# Patient Record
Sex: FEMALE | Race: WHITE | NOT HISPANIC OR LATINO | URBAN - METROPOLITAN AREA
[De-identification: names, ages, dates, MRNs, and addresses within clinical notes are randomized per-mention and may not be internally consistent; named-entity substitution may affect disease eponyms.]

---

## 2021-12-08 ENCOUNTER — AMBULATORY SURGICAL CENTER (OUTPATIENT)
Dept: URBAN - METROPOLITAN AREA SURGERY 2 | Facility: SURGERY | Age: 56
End: 2021-12-08
Payer: COMMERCIAL

## 2021-12-08 VITALS
SYSTOLIC BLOOD PRESSURE: 129 MMHG | DIASTOLIC BLOOD PRESSURE: 111 MMHG | HEART RATE: 65 BPM | DIASTOLIC BLOOD PRESSURE: 75 MMHG | DIASTOLIC BLOOD PRESSURE: 89 MMHG | DIASTOLIC BLOOD PRESSURE: 87 MMHG | HEART RATE: 81 BPM | HEIGHT: 67 IN | OXYGEN SATURATION: 98 % | SYSTOLIC BLOOD PRESSURE: 130 MMHG | DIASTOLIC BLOOD PRESSURE: 111 MMHG | SYSTOLIC BLOOD PRESSURE: 130 MMHG | WEIGHT: 133 LBS | SYSTOLIC BLOOD PRESSURE: 161 MMHG | DIASTOLIC BLOOD PRESSURE: 87 MMHG | HEIGHT: 67 IN | RESPIRATION RATE: 18 BRPM | OXYGEN SATURATION: 97 % | HEART RATE: 75 BPM | HEIGHT: 67 IN | TEMPERATURE: 98.3 F | WEIGHT: 133 LBS | OXYGEN SATURATION: 97 % | HEART RATE: 81 BPM | RESPIRATION RATE: 18 BRPM | OXYGEN SATURATION: 95 % | DIASTOLIC BLOOD PRESSURE: 78 MMHG | HEART RATE: 78 BPM | DIASTOLIC BLOOD PRESSURE: 87 MMHG | WEIGHT: 133 LBS | TEMPERATURE: 98.3 F | SYSTOLIC BLOOD PRESSURE: 161 MMHG | DIASTOLIC BLOOD PRESSURE: 75 MMHG | DIASTOLIC BLOOD PRESSURE: 89 MMHG | OXYGEN SATURATION: 96 % | SYSTOLIC BLOOD PRESSURE: 131 MMHG | OXYGEN SATURATION: 98 % | DIASTOLIC BLOOD PRESSURE: 89 MMHG | OXYGEN SATURATION: 98 % | TEMPERATURE: 98 F | SYSTOLIC BLOOD PRESSURE: 129 MMHG | SYSTOLIC BLOOD PRESSURE: 139 MMHG | SYSTOLIC BLOOD PRESSURE: 139 MMHG | OXYGEN SATURATION: 95 % | SYSTOLIC BLOOD PRESSURE: 161 MMHG | SYSTOLIC BLOOD PRESSURE: 129 MMHG | OXYGEN SATURATION: 96 % | RESPIRATION RATE: 18 BRPM | SYSTOLIC BLOOD PRESSURE: 131 MMHG | HEART RATE: 95 BPM | HEART RATE: 78 BPM | OXYGEN SATURATION: 95 % | SYSTOLIC BLOOD PRESSURE: 139 MMHG | RESPIRATION RATE: 16 BRPM | HEART RATE: 75 BPM | TEMPERATURE: 98 F | OXYGEN SATURATION: 97 % | DIASTOLIC BLOOD PRESSURE: 78 MMHG | DIASTOLIC BLOOD PRESSURE: 111 MMHG | RESPIRATION RATE: 16 BRPM | HEART RATE: 81 BPM | HEART RATE: 78 BPM | SYSTOLIC BLOOD PRESSURE: 131 MMHG | SYSTOLIC BLOOD PRESSURE: 130 MMHG | HEART RATE: 95 BPM | HEART RATE: 95 BPM | HEART RATE: 65 BPM | HEART RATE: 65 BPM | DIASTOLIC BLOOD PRESSURE: 75 MMHG | OXYGEN SATURATION: 96 % | TEMPERATURE: 98 F | TEMPERATURE: 98.3 F | HEART RATE: 75 BPM | DIASTOLIC BLOOD PRESSURE: 78 MMHG | RESPIRATION RATE: 16 BRPM

## 2021-12-08 DIAGNOSIS — Z12.11 ENCOUNTER FOR SCREENING FOR MALIGNANT NEOPLASM OF COLON: ICD-10-CM

## 2021-12-08 DIAGNOSIS — Z80.0 FAMILY HISTORY OF MALIGNANT NEOPLASM OF DIGESTIVE ORGANS: ICD-10-CM

## 2021-12-08 PROCEDURE — G0105 COLORECTAL SCRN; HI RISK IND: HCPCS | Performed by: INTERNAL MEDICINE

## 2024-06-06 ENCOUNTER — OFFICE (OUTPATIENT)
Dept: URBAN - METROPOLITAN AREA CLINIC 19 | Facility: CLINIC | Age: 59
End: 2024-06-06
Payer: COMMERCIAL

## 2024-06-06 VITALS
WEIGHT: 145 LBS | DIASTOLIC BLOOD PRESSURE: 82 MMHG | SYSTOLIC BLOOD PRESSURE: 138 MMHG | OXYGEN SATURATION: 99 % | HEIGHT: 67 IN | HEART RATE: 78 BPM

## 2024-06-06 DIAGNOSIS — K64.4 RESIDUAL HEMORRHOIDAL SKIN TAGS: ICD-10-CM

## 2024-06-06 DIAGNOSIS — Z80.0 FAMILY HISTORY OF MALIGNANT NEOPLASM OF DIGESTIVE ORGANS: ICD-10-CM

## 2024-06-06 PROCEDURE — 99213 OFFICE O/P EST LOW 20 MIN: CPT | Performed by: PHYSICIAN ASSISTANT

## 2024-06-06 RX ORDER — HYDROCORTISONE 25 MG/G
CREAM TOPICAL
Qty: 30 | Refills: 0 | Status: ACTIVE
Start: 2024-06-06

## 2025-05-12 ENCOUNTER — OFFICE (OUTPATIENT)
Dept: URBAN - METROPOLITAN AREA CLINIC 19 | Facility: CLINIC | Age: 60
End: 2025-05-12
Payer: COMMERCIAL

## 2025-05-12 VITALS
HEART RATE: 44 BPM | WEIGHT: 145 LBS | OXYGEN SATURATION: 98 % | DIASTOLIC BLOOD PRESSURE: 75 MMHG | HEIGHT: 67 IN | SYSTOLIC BLOOD PRESSURE: 117 MMHG

## 2025-05-12 DIAGNOSIS — R10.13 EPIGASTRIC PAIN: ICD-10-CM

## 2025-05-12 DIAGNOSIS — K21.9 GASTRO-ESOPHAGEAL REFLUX DISEASE WITHOUT ESOPHAGITIS: ICD-10-CM

## 2025-05-12 PROCEDURE — 99214 OFFICE O/P EST MOD 30 MIN: CPT | Performed by: NURSE PRACTITIONER

## 2025-05-12 RX ORDER — PANTOPRAZOLE SODIUM 40 MG/1
40 TABLET, DELAYED RELEASE ORAL
Qty: 30 | Refills: 3 | Status: ACTIVE
Start: 2025-05-12

## 2025-05-12 NOTE — SERVICEHPINOTES
60-year-old  female with PMH including hemorrhoids.
alesia dumas She presents to clinic today with new onset reflux in the setting of doing a fruit juice cleanse. No history of GERD. She states all of a sudden she started having midepigastric discomfort and regurgitation and reflux after trying her fruit juice cleanse. She saw her primary care physician who gave her pantoprazole and she has felt tremendously better over the last 2 weeks. We discussed continuing the PPI for 6 more weeks and then following up to see how she is feeling. EGD if not better. Rare NSAID use. No melena, hematochezia, hematemesis, coffee-ground emesis, dysphagia, odynophagia, unintentional weight loss, anorexia, diarrhea, constipation, rectal pain, fever.
alesia dumasNo family history of stomach or esophageal cancer. Father was diagnosed with colon cancer at the age of 59. Her last colonoscopy was in 2021 with normal mucosa in the terminal ileum and entire colon. Recall colon is 2026.

## 2025-06-23 ENCOUNTER — OFFICE (OUTPATIENT)
Dept: URBAN - METROPOLITAN AREA CLINIC 19 | Facility: CLINIC | Age: 60
End: 2025-06-23
Payer: COMMERCIAL

## 2025-06-23 VITALS
SYSTOLIC BLOOD PRESSURE: 128 MMHG | DIASTOLIC BLOOD PRESSURE: 86 MMHG | HEART RATE: 68 BPM | HEIGHT: 67 IN | WEIGHT: 141 LBS | OXYGEN SATURATION: 96 %

## 2025-06-23 DIAGNOSIS — K21.9 GASTRO-ESOPHAGEAL REFLUX DISEASE WITHOUT ESOPHAGITIS: ICD-10-CM

## 2025-06-23 PROCEDURE — 99213 OFFICE O/P EST LOW 20 MIN: CPT | Performed by: NURSE PRACTITIONER

## 2025-06-23 RX ORDER — PANTOPRAZOLE 40 MG/1
40 TABLET, DELAYED RELEASE ORAL
Qty: 30 | Refills: 5 | Status: ACTIVE
Start: 2025-05-12

## 2025-06-23 NOTE — SERVICEHPINOTES
60-year-old  female with PMH including hemorrhoids.
br
alesia She presents to clinic today for her follow-up. I saw her on 5/12/25 for new onset reflux in the setting of a fruit juice cleanse. She had already started pantoprazole x 2 weeks and was seeing improvement. We discussed taking PPI for 6 more weeks and then coming in for follow-up to discuss if needed EGD. She states she is doing much better since taking her medication correctly. She was taking the pantoprazole at 7:00 a.m. and then not eating breakfast until 11:00 a.m.. Since taking the medication correctly, she has seen significant improvement. She states she does not have symptoms. She is also chewing gorge gum after every meal which she believes helps her. She also does Ref[employer] which she believes helps her as well. She has a bowel movement once a day. She is about to go to [place] to visit her daughter in 3 weeks. No NSAID use. No melena, hematochezia, hematemesis, coffee-ground emesis, dysphagia, odynophagia, unintentional weight loss, anorexia, diarrhea, constipation, fever, rectal pain. She asked about weaning off the PPI. We discussed continuing it for now especially since she is about to go to [place]. She can see how she does when she gets back and if she becomes symptomatic when weaning to start it back again. She will let us know if she starts having symptoms again. No family history of stomach or esophageal cancer. Father was diagnosed with colon cancer at the age of 59. Her last colonoscopy was in 2021 with normal mucosa in the terminal ileum and entire colon. Recall colon is 2026.

## 2025-08-26 ENCOUNTER — OFFICE (OUTPATIENT)
Dept: URBAN - METROPOLITAN AREA CLINIC 11 | Facility: CLINIC | Age: 60
End: 2025-08-26
Payer: COMMERCIAL

## 2025-08-26 VITALS
HEART RATE: 71 BPM | HEIGHT: 67 IN | WEIGHT: 139 LBS | DIASTOLIC BLOOD PRESSURE: 96 MMHG | SYSTOLIC BLOOD PRESSURE: 145 MMHG | OXYGEN SATURATION: 98 %

## 2025-08-26 DIAGNOSIS — R09.89 OTHER SPECIFIED SYMPTOMS AND SIGNS INVOLVING THE CIRCULATORY: ICD-10-CM

## 2025-08-26 DIAGNOSIS — K21.9 GASTRO-ESOPHAGEAL REFLUX DISEASE WITHOUT ESOPHAGITIS: ICD-10-CM

## 2025-08-26 PROCEDURE — 99213 OFFICE O/P EST LOW 20 MIN: CPT
